# Patient Record
Sex: MALE | Race: WHITE | URBAN - METROPOLITAN AREA
[De-identification: names, ages, dates, MRNs, and addresses within clinical notes are randomized per-mention and may not be internally consistent; named-entity substitution may affect disease eponyms.]

---

## 2019-04-29 NOTE — TELEPHONE ENCOUNTER
FUTURE VISIT INFORMATION      FUTURE VISIT INFORMATION:    Date: 6/21/19    Time: 12PM    Location: [facility] – [place]  REFERRAL INFORMATION:    Referring provider:  Dr. Missael Gunter    Referring providers clinic:  [facility]    Reason for visit/diagnosis:  Anal Warts     NOTES STATUS DETAILS   OFFICE NOTE from referring provider  Care Everywhere 4/19/19   OFFICE NOTE from other specialist   N/A    DISCHARGE SUMMARY FROM HOSPITAL N/A    DISCHARGE REPORT FROM ED N/A    OPERATIVE REPORT  N/A    PFC REPORT N/A    MEDICATION LIST N/A    LABS     FIT/STOOL TESTING N/A    ANAL PAP N/A    PATHOLOGY REPORTS RELATED TO DIAGNOSIS N/A    DIAGNOSTIC PROCEDURES     COLONOSCOPY N/A    ENDOSCOPY (EGD) N/A    ERCP N/A    ANOSCOPY N/A    FLEX SIGMOIDOSCOPY N/A    IMAGING & REPORT      CT, MRI, US, XR N/A

## 2019-05-30 ENCOUNTER — TELEPHONE (OUTPATIENT)
Dept: SURGERY | Facility: CLINIC | Age: 29
End: 2019-05-30

## 2019-05-30 NOTE — TELEPHONE ENCOUNTER
LM for patient reminding him of his appt with Dorothea Black NP on 5/31/19 @ 9:45am. Left direct number for patient to call back if unable to make appt.    Radha Irene LPN

## 2019-05-31 ENCOUNTER — OFFICE VISIT (OUTPATIENT)
Dept: SURGERY | Facility: CLINIC | Age: 29
End: 2019-05-31
Payer: COMMERCIAL

## 2019-05-31 ENCOUNTER — PRE VISIT (OUTPATIENT)
Dept: SURGERY | Facility: CLINIC | Age: 29
End: 2019-05-31

## 2019-05-31 VITALS
SYSTOLIC BLOOD PRESSURE: 120 MMHG | BODY MASS INDEX: 23.57 KG/M2 | DIASTOLIC BLOOD PRESSURE: 74 MMHG | HEIGHT: 72 IN | WEIGHT: 174 LBS | OXYGEN SATURATION: 96 %

## 2019-05-31 DIAGNOSIS — A63.0 PERIANAL CONDYLOMATA: Primary | ICD-10-CM

## 2019-05-31 ASSESSMENT — ENCOUNTER SYMPTOMS
CONSTIPATION: 0
DOUBLE VISION: 0
NAIL CHANGES: 0
SKIN CHANGES: 0
RECTAL PAIN: 1
SINUS CONGESTION: 0
EYE PAIN: 0
TASTE DISTURBANCE: 0
VOMITING: 0
POSTURAL DYSPNEA: 0
COUGH DISTURBING SLEEP: 0
SMELL DISTURBANCE: 0
SORE THROAT: 1
WHEEZING: 0
BLOATING: 0
DYSPNEA ON EXERTION: 0
SNORES LOUDLY: 1
NAUSEA: 0
SPUTUM PRODUCTION: 1
SINUS PAIN: 0
EYE WATERING: 1
DIARRHEA: 1
BLOOD IN STOOL: 1
POOR WOUND HEALING: 0
EYE IRRITATION: 1
NECK MASS: 0
SHORTNESS OF BREATH: 0
HEMOPTYSIS: 0
COUGH: 1
HEARTBURN: 0
ABDOMINAL PAIN: 0
EYE REDNESS: 0
TROUBLE SWALLOWING: 0
BOWEL INCONTINENCE: 0
JAUNDICE: 0
HOARSE VOICE: 0

## 2019-05-31 ASSESSMENT — PAIN SCALES - GENERAL: PAINLEVEL: NO PAIN (0)

## 2019-05-31 ASSESSMENT — MIFFLIN-ST. JEOR: SCORE: 1797.26

## 2019-05-31 NOTE — NURSING NOTE
Chief Complaint   Patient presents with     Wart     Anal warts.       Vitals:    05/31/19 0947   BP: 120/74   BP Location: Left arm   Patient Position: Sitting   Cuff Size: Adult Regular   SpO2: 96%   Weight: 174 lb   Height: 6'       Body mass index is 23.6 kg/m .      Yi Severino, EMT

## 2019-05-31 NOTE — LETTER
2019       RE: Himanshu Dickens  [address]  [address]  [place] [address]     Dear Colleague,    Thank you for referring your patient, Himanshu Dickens, to the [facility] COLON AND RECTAL SURGERY at [facility]. Please see a copy of my visit note below.    Colon and Rectal Surgery Consult Clinic Note    Date: 2019     Referring provider:  [address] [id number]  [place] [address]     RE: Himanshu Dickens  : 1990  MAGED: 2019    Himanshu Dickens is a very pleasant 28 year old male without a significant past medical history with a recent diagnosis of perianal warts. Given these findings they were subsequently sent to the Colon and Rectal Surgery Clinic for an opinion on this and a new patient consultation.     HPI: Shady is here today to discuss treatment options for his anal warts. He has been treated in the past for genital warts and was treated by Dermatology. These have not recurred. He has a history of alcohol abuse and has been sober for two years. He quit smoking and chew in 2019. He denies anal intercourse. He endorses daily liquid stools when he was a drinker but his bowel movements have since normalized. He has bowel movements BID that are soft, formed, and brown. He denies any constipation or diarrhea. He denies any abdominal pain or pain with bowel movements. He has no history of abdominal or rectal surgery. He does notice occasional bright red blood when wiping after bowel movements. He has a history of hemorrhoids but these have never been treated. He does not take any fiber supplements. He has no further concerns at this time.    Physical Examination: Exam was chaperoned by Yi Severino, EMT  /74 (BP Location: Left arm, Patient Position: Sitting, Cuff Size: Adult Regular)   Ht 6'   Wt 174 lb   SpO2 96%   BMI 23.60 kg/m     General: Well-nourished male. Alert and orientated. Calm and cooperative.    HEENT: Mucus membranes moist.    Perianal external examination:  Perianal skin: intact.  Lesions: Yes: Perianal clusters of very small condyloma present in the right and anterior positions with a few small condyloma scattered in the left lateral position.  Eversion of buttocks: There was not evidence of an anal fissure. Details: N/A.  Skin tags or external hemorrhoids: Yes: Small skin tag right buttock..  Digital rectal examination: Was performed.   Sphincter tone: Good.  Palpable lesions: No.  Prostate: Normal.  Other: Small soft lesions palpable circumferentially.    Anoscopy: Was performed.   Hemorrhoids: Yes. Grade 1-2 hemorrhoids. No active bleeding. Hypertrophied anal papilla present at dentate line.  Lesions: No    Assessment/Plan: 28 year old male without a significant past medical history with perianal condylomata.     We discussed options for treatment of perianal warts. The procedure and risks of application of trichloracetic acid were explained. Discussed possibility of treatment failure and necessity of follow-up treatment in 3-6 weeks with repeat TCA application or fulguration. Discussed continued avoidance of smoking to prevent recurrence of warts. The patient agrees to continue with procedure and tolerated it well. Discussed long term surveillance for recurrence and I would like to see him at least every 6 months for a wart check. Patient will call or return to clinic if complications occur. Patient's questions were answered to their stated satisfaction and they are in agreement with this plan.    Medical history:  No past medical history on file.    Surgical history:  No past surgical history on file.    Problem list:  There are no active problems to display for this patient.      Medications:  No current outpatient medications on file.       Allergies:  No Known Allergies    Family history:  No family history on file.    Social history:  Social History     Tobacco Use     Smoking status: Former  Smoker     Start date: 1/1/2019     Smokeless tobacco: Former User     Quit date: 1/1/2019   Substance Use Topics     Alcohol use: Not on file    Marital status: single.      Nursing Notes:   Yi Severino EMT  5/31/2019 10:04 AM  Signed  Chief Complaint   Patient presents with     Wart     Anal warts.       Vitals:    05/31/19 0947   BP: 120/74   BP Location: Left arm   Patient Position: Sitting   Cuff Size: Adult Regular   SpO2: 96%   Weight: 174 lb   Height: 6'       Body mass index is 23.6 kg/m .      MAGO Smith        Total face to face time was 20 minutes, outside the procedure time, >50% counseling.    LILIAN Molina, NP-C  Colon and Rectal Surgery   [facility]    This note was created using speech recognition software and may contain unintended word substitutions.

## 2019-05-31 NOTE — PROGRESS NOTES
Colon and Rectal Surgery Consult Clinic Note    Date: 2019     Referring provider:  [address] [address]     RE: Himanshu Dickens  : 1990  MAGED: 2019    Himanshu Dickens is a very pleasant 28 year old male without a significant past medical history with a recent diagnosis of perianal warts. Given these findings they were subsequently sent to the Colon and Rectal Surgery Clinic for an opinion on this and a new patient consultation.     HPI: Shady is here today to discuss treatment options for his anal warts. He has been treated in the past for genital warts and was treated by Dermatology. These have not recurred. He has a history of alcohol abuse and has been sober for two years. He quit smoking and chew in 2019. He denies anal intercourse. He endorses daily liquid stools when he was a drinker but his bowel movements have since normalized. He has bowel movements BID that are soft, formed, and brown. He denies any constipation or diarrhea. He denies any abdominal pain or pain with bowel movements. He has no history of abdominal or rectal surgery. He does notice occasional bright red blood when wiping after bowel movements. He has a history of hemorrhoids but these have never been treated. He does not take any fiber supplements. He has no further concerns at this time.    Physical Examination: Exam was chaperoned by Yi Severino EMT  /74 (BP Location: Left arm, Patient Position: Sitting, Cuff Size: Adult Regular)   Ht 6'   Wt 174 lb   SpO2 96%   BMI 23.60 kg/m    General: Well-nourished male. Alert and orientated. Calm and cooperative.   HEENT: Mucus membranes moist.    Perianal external examination:  Perianal skin: intact.  Lesions: Yes: Perianal clusters of very small condyloma present in the right and anterior positions with a few small condyloma scattered in the left lateral position.  Eversion of buttocks: There was not  evidence of an anal fissure. Details: N/A.  Skin tags or external hemorrhoids: Yes: Small skin tag right buttock..  Digital rectal examination: Was performed.   Sphincter tone: Good.  Palpable lesions: No.  Prostate: Normal.  Other: Small soft lesions palpable circumferentially.    Anoscopy: Was performed.   Hemorrhoids: Yes. Grade 1-2 hemorrhoids. No active bleeding. Hypertrophied anal papilla present at dentate line.  Lesions: No    Assessment/Plan: 28 year old male without a significant past medical history with perianal condylomata.     We discussed options for treatment of perianal warts. The procedure and risks of application of trichloracetic acid were explained. Discussed possibility of treatment failure and necessity of follow-up treatment in 3-6 weeks with repeat TCA application or fulguration. Discussed continued avoidance of smoking to prevent recurrence of warts. The patient agrees to continue with procedure and tolerated it well. Discussed long term surveillance for recurrence and I would like to see him at least every 6 months for a wart check. Patient will call or return to clinic if complications occur. Patient's questions were answered to their stated satisfaction and they are in agreement with this plan.    Medical history:  No past medical history on file.    Surgical history:  No past surgical history on file.    Problem list:  There are no active problems to display for this patient.      Medications:  No current outpatient medications on file.       Allergies:  No Known Allergies    Family history:  No family history on file.    Social history:  Social History     Tobacco Use     Smoking status: Former Smoker     Start date: 1/1/2019     Smokeless tobacco: Former User     Quit date: 1/1/2019   Substance Use Topics     Alcohol use: Not on file    Marital status: single.      Nursing Notes:   Yi Severino, EMT  5/31/2019 10:04 AM  Signed  Chief Complaint   Patient presents with     Wart      Anal warts.       Vitals:    05/31/19 0947   BP: 120/74   BP Location: Left arm   Patient Position: Sitting   Cuff Size: Adult Regular   SpO2: 96%   Weight: 174 lb   Height: 6'       Body mass index is 23.6 kg/m .      MAGO Smith                         Total face to face time was 20 minutes, outside the procedure time, >50% counseling.    LILIAN Molina, NP-C  Colon and Rectal Surgery   [facility]    This note was created using speech recognition software and may contain unintended word substitutions.  Answers for HPI/ROS submitted by the patient on 5/31/2019   General Symptoms: No  Skin Symptoms: Yes  HENT Symptoms: Yes  EYE SYMPTOMS: Yes  HEART SYMPTOMS: No  LUNG SYMPTOMS: Yes  INTESTINAL SYMPTOMS: Yes  URINARY SYMPTOMS: No  REPRODUCTIVE SYMPTOMS: No  SKELETAL SYMPTOMS: No  BLOOD SYMPTOMS: No  NERVOUS SYSTEM SYMPTOMS: No  MENTAL HEALTH SYMPTOMS: No  Changes in hair: No  Changes in moles/birth marks: No  Itching: Yes  Rashes: Yes  Changes in nails: No  Acne: No  Change in facial hair: No  Warts: No  Non-healing sores: No  Scarring: No  Flaking of skin: No  Color changes of hands/feet in cold : No  Sun sensitivity: No  Skin thickening: No  Ear pain: No  Ear discharge: No  Hearing loss: No  Tinnitus: No  Nosebleeds: Yes  Congestion: No  Sinus pain: No  Trouble swallowing: No   Voice hoarseness: No  Mouth sores: No  Sore throat: Yes  Tooth pain: No  Gum tenderness: No  Bleeding gums: No  Change in taste: No  Change in sense of smell: No  Dry mouth: No  Hearing aid used: No  Neck lump: No  Eye pain: No  Vision loss: No  Dry eyes: No  Watery eyes: Yes  Eye bulging: No  Double vision: No  Flashing of lights: No  Spots: No  Floaters: No  Redness: No  Crossed eyes: No  Tunnel Vision: No  Yellowing of eyes: No  Eye irritation: Yes  Cough: Yes  Sputum or phlegm: Yes  Coughing up blood: No  Difficulty breating or shortness of breath: No  Snoring: Yes  Wheezing:  No  Difficulty breathing on exertion: No  Nighttime Cough: No  Difficulty breathing when lying flat: No  Heart burn or indigestion: No  Nausea: No  Vomiting: No  Abdominal pain: No  Bloating: No  Constipation: No  Diarrhea: Yes  Blood in stool: Yes  Black stools: No  Rectal or Anal pain: Yes  Fecal incontinence: No  Yellowing of skin or eyes: No  Vomit with blood: No  Change in stools: No

## 2019-06-21 ENCOUNTER — PRE VISIT (OUTPATIENT)
Dept: SURGERY | Facility: CLINIC | Age: 29
End: 2019-06-21